# Patient Record
Sex: FEMALE | Race: WHITE
[De-identification: names, ages, dates, MRNs, and addresses within clinical notes are randomized per-mention and may not be internally consistent; named-entity substitution may affect disease eponyms.]

---

## 2018-10-01 ENCOUNTER — HOSPITAL ENCOUNTER (OUTPATIENT)
Dept: HOSPITAL 95 - LAB EV | Age: 78
Discharge: HOME | End: 2018-10-01
Attending: PHYSICIAN ASSISTANT
Payer: MEDICARE

## 2018-10-01 DIAGNOSIS — N39.0: Primary | ICD-10-CM

## 2020-11-09 NOTE — NUR
PT ARRIVED BACK TO [facility] ANGIO REPORT RECEIVED FROM
JUANCHO AVILA, RIGHT RADIAL ACCESS SITE TR BAND WITH 11CC'S OF AIR. NO
HEMATOMA/BLEEDING ON THE SITE AND AROUND THE AREA, VITALS HRR SR 60-70'S, BP
SYSTOLIC 130'S-150'S, SATS ABOVE 95% ON RA, AFEBRILE. SON AND DAUGHTER AT
BEDSIDE, PT DENIES ANY CHEST PAIN, MILD TOLERABLE PAIN ON THE ACCESS SITE. PT
HAS MILD-MODERATE DSE CAD, ON LAD AND LEFT CIRCUMFLEX NO INERVENTION WAS DONE
CAN BE MEDICALLY MANAGED AT THIS POINT. NO OTHER ISSUES ENCOUNTERED FRO ROBERTO
SHIFT, PT IN BED RESTING ABLE TO MAKE NEEDS KNOWN, WILL MONITOR UNTIL END OF
SHIFT

## 2020-11-09 NOTE — NUR
TR BAND FULLY RECOVERED AT THIS TIME, TRANSPARENT DRESSIGN APPLIED ON RIGHT
RADIAL ACCESS SITE, NO HEMATOMA OR BLEEDING NOTED. PT DENIES ANY CHEST PAIN,
STARTED ON IMDUR PO THIS EVENING. VITALS HAS BEEN STABLE, WILL REPORT TO
ONCOMING SHIFT.

## 2020-11-10 NOTE — NUR
DR PORTILLO CAME TO SEE PT AND CLEARED HER FOR DC. CHARGE RN SPOKE WITH DR PEÑA
WHO STATED PT WAS FREE TO GO AND HE DID NOT NEED TO SEE HER PRIOR TO DC. PT
TOLD DR PORTILLO THAT IMDUR GAVE HER SEVERE HEADACHE SO DR DISCONTINUED IT AND
ORDERED SL NITRO INSTEAD. INSTRUCTIONS GIVEN TO PT REGARDING THIS AS WELL AS
CARE FOR RADIAL SITE. INSTRUCTED PT ABOUT REMOVING DRESSING AND HOLDING
PRESSURE IF BLEEDING OR SWELLING OCCURS. IV REMOVED WNL. DENIED FURTHER
QUESTIONS OR CONCERNS. ESCORTED OUT VIA WHEEL CHAIR BY HOSPITAL STAFF.

## 2020-11-10 NOTE — NUR
SHIFT SUMMARY
PATIENT PLEASENT AND COOPERATIVE THROUGHOUT THE NIGHT. PATIENT REPORTED SHE
WAS ABLE TO NAP ON AND OFF THROUGHOUT THE NIGHT. PATIENT'S TR SITE TO RIGTH
WRIST APPEARS SOFT WITH NO SIGNS OF BLEEDING, BRUISING, OR HEMATOMA FORMATION
NOTED. ARMBOARD IN PLACE. PATIENT REPORTS SLIGHT NAUSEA THIS AM, PATIETN
PROVIDED WITH CRACKERS, PATIENT DENIED THE NEED FOR ANY FURTHER INTERVENTIONS
AT THIS TIME. PATIENT MEDICATED FOR A HEADACHE PER EMAR. WILL CONTINUE TO
MONITOR PATIENT AND REPROT TO ONCOMING RN.

## 2020-11-10 NOTE — NUR
SUMMARY
PT A&O; PLEASANT & COMPLIANT W/ CARE; DENIES CHEST PAIN; VSS; NSR NOTED ON
TELE W/ HR IN 60'S; O2 SATS > 93 ON RA ON SECOND DIGIT RIGHT HAND; R RADIAL
SITE W/SCANT DRIED BLOOD W/ TEGADERM IN PLACE; ARM BOARD IN PLACE; PT
EDUCATION PROVIDED W/ PRINTED MATERIAL FOR NEW CARDIAC MEDICATIONS; PT C/O
HEADACHE AND EXPRESSED CONCERN W/ TAKING NARCOTICS;  NOTIFIED AND
NEW ORDER FOR TYLENOL GIVEN; 650MG TYLENOL ADMINISTERED PER EMAR AND ICE PACK
OFFERED TO PT; CALL LIGHT IN REACH; BED IN LOWEST POSITION; REPORT GIVEN TO
MARGARITA CABRERA.

## 2020-11-10 NOTE — NUR
ASSUMED CARE:
 
PT AWAKE, SITTING UP IN BED. DENIES CHEST PAIN. RIGHT RADIAL SITE WITHOUT SIGN
OF BRUISING OR HEMATOMA. PT STATES IMDUR GAVE HER A HEADACHE LAST NIGHT AND
SHE IS HESITANT TO TAKE IT AGAIN UNTIL SPEAKING WITH MD. DENIES FURTHER NEEDS
OR CONCERNS.